# Patient Record
Sex: FEMALE | Race: WHITE | NOT HISPANIC OR LATINO | Employment: FULL TIME | URBAN - METROPOLITAN AREA
[De-identification: names, ages, dates, MRNs, and addresses within clinical notes are randomized per-mention and may not be internally consistent; named-entity substitution may affect disease eponyms.]

---

## 2024-02-05 ENCOUNTER — OFFICE VISIT (OUTPATIENT)
Dept: FAMILY MEDICINE | Facility: CLINIC | Age: 39
End: 2024-02-05
Payer: COMMERCIAL

## 2024-02-05 VITALS
HEART RATE: 53 BPM | TEMPERATURE: 98.6 F | DIASTOLIC BLOOD PRESSURE: 89 MMHG | RESPIRATION RATE: 14 BRPM | SYSTOLIC BLOOD PRESSURE: 146 MMHG | OXYGEN SATURATION: 98 %

## 2024-02-05 DIAGNOSIS — B37.31 YEAST INFECTION OF THE VAGINA: ICD-10-CM

## 2024-02-05 DIAGNOSIS — R20.2 LEG PARESTHESIA: ICD-10-CM

## 2024-02-05 DIAGNOSIS — R35.0 URINARY FREQUENCY: Primary | ICD-10-CM

## 2024-02-05 DIAGNOSIS — B96.89 BV (BACTERIAL VAGINOSIS): ICD-10-CM

## 2024-02-05 DIAGNOSIS — N76.0 BV (BACTERIAL VAGINOSIS): ICD-10-CM

## 2024-02-05 PROBLEM — I10 ESSENTIAL HYPERTENSION: Status: ACTIVE | Noted: 2021-10-01

## 2024-02-05 LAB
ALBUMIN UR-MCNC: NEGATIVE MG/DL
APPEARANCE UR: CLEAR
BACTERIA #/AREA URNS HPF: ABNORMAL /HPF
BILIRUB UR QL STRIP: NEGATIVE
CLUE CELLS: PRESENT
COLOR UR AUTO: YELLOW
GLUCOSE UR STRIP-MCNC: NEGATIVE MG/DL
HGB UR QL STRIP: NEGATIVE
KETONES UR STRIP-MCNC: NEGATIVE MG/DL
LEUKOCYTE ESTERASE UR QL STRIP: ABNORMAL
NITRATE UR QL: NEGATIVE
PH UR STRIP: 6.5 [PH] (ref 5–8)
RBC #/AREA URNS AUTO: ABNORMAL /HPF
SP GR UR STRIP: 1.01 (ref 1–1.03)
SQUAMOUS #/AREA URNS AUTO: ABNORMAL /LPF
TRICHOMONAS, WET PREP: ABNORMAL
UROBILINOGEN UR STRIP-ACNC: 0.2 E.U./DL
WBC #/AREA URNS AUTO: ABNORMAL /HPF
WBC'S/HIGH POWER FIELD, WET PREP: ABNORMAL
YEAST, WET PREP: PRESENT

## 2024-02-05 PROCEDURE — 87210 SMEAR WET MOUNT SALINE/INK: CPT | Performed by: PHYSICIAN ASSISTANT

## 2024-02-05 PROCEDURE — 87086 URINE CULTURE/COLONY COUNT: CPT | Performed by: PHYSICIAN ASSISTANT

## 2024-02-05 PROCEDURE — 81001 URINALYSIS AUTO W/SCOPE: CPT | Performed by: PHYSICIAN ASSISTANT

## 2024-02-05 RX ORDER — METRONIDAZOLE 500 MG/1
500 TABLET ORAL 2 TIMES DAILY
Qty: 14 TABLET | Refills: 0 | Status: SHIPPED | OUTPATIENT
Start: 2024-02-05 | End: 2024-02-12

## 2024-02-05 RX ORDER — FLUCONAZOLE 150 MG/1
TABLET ORAL
Qty: 3 TABLET | Refills: 0 | Status: SHIPPED | OUTPATIENT
Start: 2024-02-05 | End: 2024-08-09

## 2024-02-05 RX ORDER — HYDROCHLOROTHIAZIDE 12.5 MG/1
1 CAPSULE ORAL DAILY
COMMUNITY

## 2024-02-05 NOTE — PROGRESS NOTES
Chief Complaint   Patient presents with    UTI     Has urine frequency states was on walk today had tingling on whole left side  of body down leg into knee and states was leaking urine       ASSESSMENT/PLAN:  Soumya was seen today for uti.    Diagnoses and all orders for this visit:    Urinary frequency  -     UA Macroscopic with reflex to Microscopic and Culture - Clinic Collect  -     UA Microscopic with Reflex to Culture  -     Wet prep - Clinic Collect  -     Urine Culture Aerobic Bacterial - lab collect; Future  -     Urine Culture Aerobic Bacterial - lab collect    Leg paresthesia  -     Spine  Referral; Future    Yeast infection of the vagina  -     fluconazole (DIFLUCAN) 150 MG tablet; Take 1 tablet (150 mg) by mouth every 3 days for 2 to 3 doses    BV (bacterial vaginosis)  -     metroNIDAZOLE (FLAGYL) 500 MG tablet; Take 1 tablet (500 mg) by mouth 2 times daily for 7 days    Frequency without dysuria.  Still able to get the urine out and no saddle paresthesias.  Reassuring exam today with no neurologic abnormalities noted  UA negative.  Will send urine for culture.  Wet prep showed both clue cells and yeast.  Will do a longer course of Diflucan and also oral Flagyl    Considered cauda equina syndrome but this seems less likely without any saddle paresthesias and abnormal neurologic finding.  Recommend follow-up with spine specialist, referral placed.  Also consider reaching out to hip specialist that she has worked with in the past.  Return precautions discussed    Ankit Duke PA-C      SUBJECTIVE:  Soumya is a 38 year old female who presents to urgent care with about 2 weeks of a left leg heaviness type sensation.  It can be in the back, buttocks, thigh but never passes the knee.  It mostly is in the medial lower thigh.  Sometimes has some numbness and tingling but is usually more of a heaviness type sensation.  She has history of straight neck syndrome and some numbness in her left upper  extremity that comes and goes.  She has a history of right hip labral tear.  She goes to a chiropractor that does adjustments for her.  She also worked with the PT during this timeframe was done some exercises and dry needling and does not seem to be improving her symptoms.  Sometimes it is worse with walking sometimes not.  Over the last 3 to 4 days she has noticed urinary frequency and had some discharge.  She did think the discharge has a bad odor to it.  She did a televisit and was placed on Diflucan.  She is sexually active in a closed marriage.    ROS: Pertinent ROS neg other than the symptoms noted above in the HPI.     OBJECTIVE:  BP (!) 146/89   Pulse 53   Temp 98.6  F (37  C) (Oral)   Resp 14   SpO2 98%    GENERAL: alert and no distress  MS: no gross musculoskeletal defects noted, no edema  SKIN: no suspicious lesions or rashes  NEURO: Normal strength and tone, mentation intact and speech normal, no foot drop, normal gait, normal sensation of the left leg, deep tendon patellar reflexes 2+ bilaterally and symmetric    DIAGNOSTICS    Results for orders placed or performed in visit on 02/05/24   UA Macroscopic with reflex to Microscopic and Culture - Clinic Collect     Status: Abnormal    Specimen: Urine, Clean Catch   Result Value Ref Range    Color Urine Yellow Colorless, Straw, Light Yellow, Yellow    Appearance Urine Clear Clear    Glucose Urine Negative Negative mg/dL    Bilirubin Urine Negative Negative    Ketones Urine Negative Negative mg/dL    Specific Gravity Urine 1.010 1.005 - 1.030    Blood Urine Negative Negative    pH Urine 6.5 5.0 - 8.0    Protein Albumin Urine Negative Negative mg/dL    Urobilinogen Urine 0.2 0.2, 1.0 E.U./dL    Nitrite Urine Negative Negative    Leukocyte Esterase Urine Trace (A) Negative   UA Microscopic with Reflex to Culture     Status: Abnormal   Result Value Ref Range    Bacteria Urine Few (A) None Seen /HPF    RBC Urine 0-2 0-2 /HPF /HPF    WBC Urine 0-5 0-5 /HPF  /HPF    Squamous Epithelials Urine Few (A) None Seen /LPF    Narrative    Urine Culture not indicated   Wet prep - Clinic Collect     Status: Abnormal    Specimen: Vagina; Swab   Result Value Ref Range    Trichomonas Absent Absent    Yeast Present (A) Absent    Clue Cells Present (A) Absent    WBCs/high power field 1+ (A) None        Current Outpatient Medications   Medication    hydrochlorothiazide (MICROZIDE) 12.5 MG capsule     No current facility-administered medications for this visit.      There is no problem list on file for this patient.     No past medical history on file.  No past surgical history on file.  No family history on file.  Social History     Tobacco Use    Smoking status: Never    Smokeless tobacco: Never   Substance Use Topics    Alcohol use: Not on file              The plan of care was discussed with the patient. They understand and agree with the course of treatment prescribed. A printed summary was given including instructions and medications.  The use of Dragon/[employer] dictation services may have been used to construct the content in this note; any grammatical or spelling errors are non-intentional. Please contact the author of this note directly if you are in need of any clarification.

## 2024-02-06 NOTE — PROGRESS NOTES
ASSESSMENT: Soumya Olivier is a 38 year old female with past medical history significant for hypertension who presents today for new patient evaluation of a 2-year history of intermittent episodes of paresthesias in both arms and both legs.  Episodes typically resolve spontaneously within a couple of weeks.  Most recent episode began 2 to 3 weeks ago.  In current episode she has intermittent numbness and tingling and hot/cold feeling in both arms and in the left anterior thigh to the knee.  Unclear etiology of symptoms.  On exam patient reported a sensory deficit in the left anterior distal thigh.  She was hyperreflexic bilateral triceps and patellar reflexes.  Strength is intact.  She has not had any imaging of the spine.  Within the differential would be multiple sclerosis.    PLAN:  A shared decision making model was used.  The patient's values and choices were respected.  The following represents what was discussed and decided upon by the physician assistant and the patient.      1.  DIAGNOSTIC TESTS: I ordered MRI scans with and without contrast of brain, cervical, thoracic, and lumbar spine.    2.  PHYSICAL THERAPY: No physical therapy was ordered.    3.  MEDICATIONS: No changes are made to the patient's medications.  She takes ibuprofen as needed which helps.    4.  INTERVENTIONS:  No interventions were ordered today.    5.  PATIENT EDUCATION: Patient is in agreement the above plan.  All questions were answered.  -I told the patient that I may recommend referral to neurology, depending on her test results.    6.  FOLLOW-UP:   I will send the patient a [employer] message with her MRI results.  If she has questions or concerns in the meantime, she should not hesitate to call.      SUBJECTIVE:  Soumya Olivier  Is a 38 year old female who presents today in consultation at the request of Ankit Duke PA-C for new patient evaluation of paresthesias.  Patient reports that for the past 2 years she has had  intermittent episodes of paresthesias in her limbs.  She states that these episodes occur approximately every 6 months.  They typically last about 2 weeks.  Most recent episode began over 2 weeks ago.  This episode is lasting longer than any of her previous episodes.  She is unable to identify any triggers for the episodes.  They typically resolve spontaneously.  She has had chiropractic treatment which provide some relief as well.  Patient is very concerned about her symptoms because her mother  from ALS in her 40s.    Patient complains of  tingling in both arms.  Patient reports sometimes the biceps side of the arm is more affected and sometimes the tricep side is more affected.  Tends to be in the upper arms.  She has alternating hot and cold feeling in her arms.  With her current episode she also feels tingling involving the left anterior thigh to the left knee.  She states there is an area just proximal to the left anterior knee which is constantly numb and feels like scar tissue.  Occasionally the tingling has extended into the left calf but typically it stays in the thigh.  With previous episodes she had similar symptoms in the right leg but she denies any involvement of the right leg with her current episode.  She does have a history of right hip labral surgery.  Patient also has a history of chronic neck pain.  She was told that she had a straight neck.  She does feel like her neck pain is worse during her episodes of paresthesias.  She states the muscles in her neck feel tight.  She has to be careful with her activities to minimize neck pain.  She has also had occasional low back/sacral pain.  Her chiropractor often adjust her hips.  She feels a tender spot in the right SI joint region.  Recently she twisted and felt a adjustment type feeling in her lower back which was not painful.  Patient denies any weakness in her extremities.  She states that she is still able to function through these episodes.   In fact she was able to run 2 miles the other day.  She denies any muscle fasciculations.  She denies any double or blurry vision.  She denies history of optic neuritis.  3 days ago she did have leakage of urine while she was out for a walk but otherwise denies loss of bowel or bladder control.  Denies current pregnancy or plans to become pregnant soon.  Denies recent fevers.  She does endorse increased redness in the palms of her hands recently.  She also has intermittent swelling in her ankles.    Treatment to date:  - Chiropractic treatment 1-2 times per 2 months which is helpful.  - Dry needling through physical therapy which is helpful  - No spine surgeries  - No spine injections  - Ibuprofen as needed is helpful for both neck pain and for paresthesias.    Current Outpatient Medications   Medication    fluconazole (DIFLUCAN) 150 MG tablet    hydrochlorothiazide (MICROZIDE) 12.5 MG capsule    metroNIDAZOLE (FLAGYL) 500 MG tablet     No current facility-administered medications for this visit.       No Known Allergies        Patient Active Problem List   Diagnosis    Essential hypertension     Surgical history: Right hip labral surgery, varicose vein surgery 2019    Family history: Mother had ALS, father has degenerative disks and arthritis in his neck    Social history: Patient is .  She has a 4-year-old child.  She works in [employer].  She denies tobacco use.  Drinks wine on the weekends.  Denies recreational drug use.      ROS: Positive for weight gain, feet/leg swelling, color changes in hands/feet, sciatica.  Specifically negative for bowel/bladder dysfunction, fevers,chills, appetite changes, unexplained weight loss.   Otherwise 13 systems reviewed are negative.  Please see the patient's intake questionnaire from today for details.      OBJECTIVE:  PHYSICAL EXAMINATION:    CONSTITUTIONAL:  Vital signs as above.  No acute distress.  The patient is well nourished and well groomed.  PSYCHIATRIC:  The  patient is awake, alert, oriented to person, place, time and answering questions appropriately with clear speech.    HEENT: Normocephalic, atraumatic.  Sclera clear.  Neck is supple.  SKIN:  Skin over the face, bilateral lower extremities, and posterior torso is clean, dry, intact without rashes.    GAIT:  Gait is non-antalgic.  The patient is able to heel and toe walk without significant difficulty.  Tandem gait intact.  STANDING EXAMINATION: No significant tenderness palpation lumbar paraspinous muscles or sacroiliac joints.  Thoracolumbar range of motion is full.  MUSCLE STRENGTH:  The patient has 5/5 strength for the bilateral shoulder abductors, elbow flexors/extensors, wrist extensors, finger flexors/abductors, hip flexors, knee flexors/extensors, ankle dorsiflexors/plantar flexors, great toe extensors, ankle evertors/invertors.  NEUROLOGICAL: Reflexes are 2+ bilateral biceps, 3+ bilateral triceps, 2+ bilateral brachioradialis, 3+ bilateral patellar, 2+ bilateral Achilles.  Negative Shalini sign bilaterally.  Negative Babinski's bilaterally.  No ankle clonus bilaterally.  Diminished sensation left anterior distal thigh.  VASCULAR:  2/4 radial pulses bilaterally.  Bilateral lower extremities are warm.  There is no pitting edema of the bilateral lower extremities.  MUSCULOSKELETAL: Hypertonicity bilateral upper trapezius muscles.  Cervical range of motion is full.

## 2024-02-07 LAB — BACTERIA UR CULT: NORMAL

## 2024-02-08 ENCOUNTER — OFFICE VISIT (OUTPATIENT)
Dept: PHYSICAL MEDICINE AND REHAB | Facility: CLINIC | Age: 39
End: 2024-02-08
Payer: COMMERCIAL

## 2024-02-08 VITALS
BODY MASS INDEX: 23.2 KG/M2 | DIASTOLIC BLOOD PRESSURE: 80 MMHG | WEIGHT: 147.8 LBS | HEART RATE: 61 BPM | HEIGHT: 67 IN | SYSTOLIC BLOOD PRESSURE: 135 MMHG

## 2024-02-08 DIAGNOSIS — R20.2 PARESTHESIA: Primary | ICD-10-CM

## 2024-02-08 PROCEDURE — 99203 OFFICE O/P NEW LOW 30 MIN: CPT | Performed by: PHYSICIAN ASSISTANT

## 2024-02-08 RX ORDER — IBUPROFEN 200 MG
200 TABLET ORAL PRN
COMMUNITY

## 2024-02-08 ASSESSMENT — PAIN SCALES - GENERAL: PAINLEVEL: MILD PAIN (3)

## 2024-02-08 NOTE — PATIENT INSTRUCTIONS
[facility] Scheduling    Please call [phone] to schedule your image(s) (select option#1).     You can call cost of care to get an estimate for the costs of your tests: [phone]

## 2024-02-08 NOTE — LETTER
2/8/2024         RE: Soumya Olivier  [address] [address]        Dear Colleague,    Thank you for referring your patient, Soumya Olivier, to the [facility]. Please see a copy of my visit note below.    ASSESSMENT: Soumya Olivier is a 38 year old female with past medical history significant for hypertension who presents today for new patient evaluation of a 2-year history of intermittent episodes of paresthesias in both arms and both legs.  Episodes typically resolve spontaneously within a couple of weeks.  Most recent episode began 2 to 3 weeks ago.  In current episode she has intermittent numbness and tingling and hot/cold feeling in both arms and in the left anterior thigh to the knee.  Unclear etiology of symptoms.  On exam patient reported a sensory deficit in the left anterior distal thigh.  She was hyperreflexic bilateral triceps and patellar reflexes.  Strength is intact.  She has not had any imaging of the spine.  Within the differential would be multiple sclerosis.    PLAN:  A shared decision making model was used.  The patient's values and choices were respected.  The following represents what was discussed and decided upon by the physician assistant and the patient.      1.  DIAGNOSTIC TESTS: I ordered MRI scans with and without contrast of brain, cervical, thoracic, and lumbar spine.    2.  PHYSICAL THERAPY: No physical therapy was ordered.    3.  MEDICATIONS: No changes are made to the patient's medications.  She takes ibuprofen as needed which helps.    4.  INTERVENTIONS:  No interventions were ordered today.    5.  PATIENT EDUCATION: Patient is in agreement the above plan.  All questions were answered.  -I told the patient that I may recommend referral to neurology, depending on her test results.    6.  FOLLOW-UP:   I will send the patient a [employer] message with her MRI results.  If she has questions or concerns in the meantime, she should not  hesitate to call.      SUBJECTIVE:  Soumya Olivier  Is a 38 year old female who presents today in consultation at the request of Ankit Duke PA-C for new patient evaluation of paresthesias.  Patient reports that for the past 2 years she has had intermittent episodes of paresthesias in her limbs.  She states that these episodes occur approximately every 6 months.  They typically last about 2 weeks.  Most recent episode began over 2 weeks ago.  This episode is lasting longer than any of her previous episodes.  She is unable to identify any triggers for the episodes.  They typically resolve spontaneously.  She has had chiropractic treatment which provide some relief as well.  Patient is very concerned about her symptoms because her mother  from ALS in her 40s.    Patient complains of  tingling in both arms.  Patient reports sometimes the biceps side of the arm is more affected and sometimes the tricep side is more affected.  Tends to be in the upper arms.  She has alternating hot and cold feeling in her arms.  With her current episode she also feels tingling involving the left anterior thigh to the left knee.  She states there is an area just proximal to the left anterior knee which is constantly numb and feels like scar tissue.  Occasionally the tingling has extended into the left calf but typically it stays in the thigh.  With previous episodes she had similar symptoms in the right leg but she denies any involvement of the right leg with her current episode.  She does have a history of right hip labral surgery.  Patient also has a history of chronic neck pain.  She was told that she had a straight neck.  She does feel like her neck pain is worse during her episodes of paresthesias.  She states the muscles in her neck feel tight.  She has to be careful with her activities to minimize neck pain.  She has also had occasional low back/sacral pain.  Her chiropractor often adjust her hips.  She feels a tender  spot in the right SI joint region.  Recently she twisted and felt a adjustment type feeling in her lower back which was not painful.  Patient denies any weakness in her extremities.  She states that she is still able to function through these episodes.  In fact she was able to run 2 miles the other day.  She denies any muscle fasciculations.  She denies any double or blurry vision.  She denies history of optic neuritis.  3 days ago she did have leakage of urine while she was out for a walk but otherwise denies loss of bowel or bladder control.  Denies current pregnancy or plans to become pregnant soon.  Denies recent fevers.  She does endorse increased redness in the palms of her hands recently.  She also has intermittent swelling in her ankles.    Treatment to date:  - Chiropractic treatment 1-2 times per 2 months which is helpful.  - Dry needling through physical therapy which is helpful  - No spine surgeries  - No spine injections  - Ibuprofen as needed is helpful for both neck pain and for paresthesias.    Current Outpatient Medications   Medication     fluconazole (DIFLUCAN) 150 MG tablet     hydrochlorothiazide (MICROZIDE) 12.5 MG capsule     metroNIDAZOLE (FLAGYL) 500 MG tablet     No current facility-administered medications for this visit.       No Known Allergies        Patient Active Problem List   Diagnosis     Essential hypertension     Surgical history: Right hip labral surgery, varicose vein surgery 2019    Family history: Mother had ALS, father has degenerative disks and arthritis in his neck    Social history: Patient is .  She has a 4-year-old child.  She works in [employer].  She denies tobacco use.  Drinks wine on the weekends.  Denies recreational drug use.      ROS: Positive for weight gain, feet/leg swelling, color changes in hands/feet, sciatica.  Specifically negative for bowel/bladder dysfunction, fevers,chills, appetite changes, unexplained weight loss.   Otherwise 13 systems  reviewed are negative.  Please see the patient's intake questionnaire from today for details.      OBJECTIVE:  PHYSICAL EXAMINATION:    CONSTITUTIONAL:  Vital signs as above.  No acute distress.  The patient is well nourished and well groomed.  PSYCHIATRIC:  The patient is awake, alert, oriented to person, place, time and answering questions appropriately with clear speech.    HEENT: Normocephalic, atraumatic.  Sclera clear.  Neck is supple.  SKIN:  Skin over the face, bilateral lower extremities, and posterior torso is clean, dry, intact without rashes.    GAIT:  Gait is non-antalgic.  The patient is able to heel and toe walk without significant difficulty.  Tandem gait intact.  STANDING EXAMINATION: No significant tenderness palpation lumbar paraspinous muscles or sacroiliac joints.  Thoracolumbar range of motion is full.  MUSCLE STRENGTH:  The patient has 5/5 strength for the bilateral shoulder abductors, elbow flexors/extensors, wrist extensors, finger flexors/abductors, hip flexors, knee flexors/extensors, ankle dorsiflexors/plantar flexors, great toe extensors, ankle evertors/invertors.  NEUROLOGICAL: Reflexes are 2+ bilateral biceps, 3+ bilateral triceps, 2+ bilateral brachioradialis, 3+ bilateral patellar, 2+ bilateral Achilles.  Negative Shalini sign bilaterally.  Negative Babinski's bilaterally.  No ankle clonus bilaterally.  Diminished sensation left anterior distal thigh.  VASCULAR:  2/4 radial pulses bilaterally.  Bilateral lower extremities are warm.  There is no pitting edema of the bilateral lower extremities.  MUSCULOSKELETAL: Hypertonicity bilateral upper trapezius muscles.  Cervical range of motion is full.          Again, thank you for allowing me to participate in the care of your patient.        Sincerely,        Aury Childs PA-C

## 2024-02-23 ENCOUNTER — HOSPITAL ENCOUNTER (OUTPATIENT)
Dept: MRI IMAGING | Facility: CLINIC | Age: 39
Discharge: HOME | End: 2024-02-23
Attending: PHYSICIAN ASSISTANT | Admitting: PHYSICIAN ASSISTANT
Payer: COMMERCIAL

## 2024-02-23 DIAGNOSIS — R20.2 PARESTHESIA: ICD-10-CM

## 2024-02-23 PROCEDURE — 72156 MRI NECK SPINE W/O & W/DYE: CPT

## 2024-02-23 PROCEDURE — 72158 MRI LUMBAR SPINE W/O & W/DYE: CPT

## 2024-02-23 PROCEDURE — A9585 GADOBUTROL INJECTION: HCPCS | Performed by: PHYSICIAN ASSISTANT

## 2024-02-23 PROCEDURE — 255N000002 HC RX 255 OP 636: Performed by: PHYSICIAN ASSISTANT

## 2024-02-23 PROCEDURE — 70553 MRI BRAIN STEM W/O & W/DYE: CPT

## 2024-02-23 PROCEDURE — 72157 MRI CHEST SPINE W/O & W/DYE: CPT

## 2024-02-23 RX ORDER — GADOBUTROL 604.72 MG/ML
6.5 INJECTION INTRAVENOUS ONCE
Status: COMPLETED | OUTPATIENT
Start: 2024-02-23 | End: 2024-02-23

## 2024-02-23 RX ADMIN — GADOBUTROL 6.5 ML: 604.72 INJECTION INTRAVENOUS at 07:57

## 2024-02-26 DIAGNOSIS — R20.2 PARESTHESIA: Primary | ICD-10-CM

## 2024-02-26 DIAGNOSIS — M54.2 CERVICALGIA: Primary | ICD-10-CM

## 2024-03-09 ENCOUNTER — HEALTH MAINTENANCE LETTER (OUTPATIENT)
Age: 39
End: 2024-03-09

## 2024-07-29 ENCOUNTER — OFFICE VISIT (OUTPATIENT)
Dept: NEUROLOGY | Facility: CLINIC | Age: 39
End: 2024-07-29
Attending: PHYSICIAN ASSISTANT
Payer: COMMERCIAL

## 2024-07-29 VITALS — HEART RATE: 60 BPM | DIASTOLIC BLOOD PRESSURE: 83 MMHG | SYSTOLIC BLOOD PRESSURE: 134 MMHG

## 2024-07-29 DIAGNOSIS — R20.2 PARESTHESIA: ICD-10-CM

## 2024-07-29 DIAGNOSIS — M79.10 TRIGGER POINT: Primary | ICD-10-CM

## 2024-07-29 PROCEDURE — 99205 OFFICE O/P NEW HI 60 MIN: CPT | Performed by: PSYCHIATRY & NEUROLOGY

## 2024-07-29 NOTE — NURSING NOTE
Chief Complaint   Patient presents with    Consult     Paresthesia,  Referred by Aury Childs PA-C Kena Gemeda, MA on 7/29/2024 at 8:07 AM

## 2024-07-29 NOTE — LETTER
7/29/2024      Soumya Olivier  [address] [facility] [address]      Dear Colleague,    Thank you for referring your patient, Soumya Olivier, to the [facility] NEUROLOGY CLINIC [facility]. Please see a copy of my visit note below.    [facility] Neurology Consultation    Soumya Olivier MRN# [id number]   Age: 38 year old YOB: 1985     Requesting physician: Tayla Carter     Reason for Consultation: paresthesias      History of Presenting Symptoms:   Soumya Olivier is a 38 year old female who presents today for evaluation of paresthesias.      The patient was seen in a clinic 2/8/2024 with her PA-C for issues of leg and arm pain of a 2 year duration.  She described episodes of arm and leg pain occurring for weeks in bursts through the year.  Her exam was notable for sensory deficit in left anterior distal thigh and hyperreflexia in b/l triceps and patellar.  She reported her mother passed from ALS.  MRI of the entire brain and spinal axis was done 2/23/2024 with normal brain, cervical, thoracic, and lumbar cords being noted.  There was some mild degenerative disc issues at L4-5, as well as C5-7.    Today, the patient felt like she had her lower back have a locked up feeling for months or a long time.  Around 2/2024, she felt her gait changed and also got some sharp pains going down her arms (b/l) and down in to her knee (left).  By gait change, she felt like she was walking crooked and felt her left leg was flat footed.  She did have a tear in her left labrum, as evaluated recently with [facility].  She had it repaired since that time.  Also, since that time she has had upper back tightness (noticed at night) and has awoken at night with her arms falling asleep.  Shooting pain the arm is mostly gone. Her crooked walk is improved since her surgery.      She may have had several years of upper neck tightness, usually around the mid-section.  There are no issues of  diplopia, dysphagia, or muscle fasciculations.       Social History:   Works full time. Has one child. No alcohol abuse history reported.     Medications:   Hydrochlorothiazide for HTN     Physical Exam:   Vitals: /83 (BP Location: Right arm, Patient Position: Sitting)   Pulse 60    General: Seated comfortably in no acute distress.  Neurologic:     Mental Status: Fully alert, attentive and oriented. Speech clear and fluent, no paraphasic errors.      Cranial Nerves: Visual fields intact. PERRL. EOMI with normal smooth pursuit. Facial sensation intact/symmetric. Facial movements symmetric. Hearing not formally tested but intact to conversation. Palate elevation symmetric, uvula midline. No dysarthria. Shoulder shrug strong bilaterally. Tongue protrusion midline.     Motor: No tremors or other abnormal movements observed. Muscle tone normal throughout. No pronator drift. Normal/symmetric rapid finger tapping. Strength 5/5 throughout upper and lower extremities.     Deep Tendon Reflexes: 2+/symmetric throughout upper and lower extremities. No clonus. Toes downgoing bilaterally.     Sensory: Intact/symmetric to light touch, pinprick, temperature, vibration and proprioception throughout upper and lower extremities. Negative Romberg.      Coordination: Finger-nose-finger and heel-shin intact without dysmetria. Rapid alternating movements intact/symmetric with normal speed and rhythm.     Gait: Normal, steady casual gait. Able to walk on toes, heels and tandem without difficulty.         Data: Pertinent prior to visit   Imaging:  Reviewed MRI brain, cervical spine, thoracic spine, and lumbar spine as above.          Assessment and Plan:   Assessment:  Hx of left torn labrum  Trigger points of upper neck and trapezius b/l    The patient's exam is reassuringly normal, and her imaging is not suggestive for myelopathy, or central demyelinating disorder.  We spent some time discussing findings expected for conditions  like ALS, the genetics of the disease, and how her exam isn't at this time supportive for the diagnosis.  She was reassured at her normal exam, and was okay with focusing on issues like paraspinal and neck tension that was present for years.  At this time, her pattern of tightness seems to follow trigger points in the trapezius and levator scapulae. She may benefit from trigger point injections and stretching.  She is interested in continuing with her PT, but was okay with referral with physiatry.    Plan:  - PM&R referral for trigger points    Follow up in Neurology clinic as needed, or should new concerns arise.    SINAN Maciel D.O.   of Neurology    Total time today (60 min) in this patient encounter was spent on pre-charting, counseling and/or coordination of care.  The patient is in agreement with this plan and has no further questions.      Again, thank you for allowing me to participate in the care of your patient.        Sincerely,        Les Maciel, DO

## 2024-07-29 NOTE — PATIENT INSTRUCTIONS
I think your exam is normal today.  There are no signs of weakness, increased reflexes, or fasciculations concerning for ALS.  Your imaging looked normal upon review which is also reassuring for lack of evidence of demyelinating condition like MS.    At this point, I think trigger point injections and stretching will help you with your neck tension.  Overall, if new issues arise, please let me know through MyChart and we can always expand the investigation based on symptoms.

## 2024-07-29 NOTE — PROGRESS NOTES
[facility] Neurology Consultation    Soumya Olivier MRN# [id number]   Age: 38 year old YOB: 1985     Requesting physician: Tayla Carter     Reason for Consultation: paresthesias      History of Presenting Symptoms:   Soumya Olivier is a 38 year old female who presents today for evaluation of paresthesias.      The patient was seen in a clinic 2/8/2024 with her PA-C for issues of leg and arm pain of a 2 year duration.  She described episodes of arm and leg pain occurring for weeks in bursts through the year.  Her exam was notable for sensory deficit in left anterior distal thigh and hyperreflexia in b/l triceps and patellar.  She reported her mother passed from ALS.  MRI of the entire brain and spinal axis was done 2/23/2024 with normal brain, cervical, thoracic, and lumbar cords being noted.  There was some mild degenerative disc issues at L4-5, as well as C5-7.    Today, the patient felt like she had her lower back have a locked up feeling for months or a long time.  Around 2/2024, she felt her gait changed and also got some sharp pains going down her arms (b/l) and down in to her knee (left).  By gait change, she felt like she was walking crooked and felt her left leg was flat footed.  She did have a tear in her left labrum, as evaluated recently with [facility].  She had it repaired since that time.  Also, since that time she has had upper back tightness (noticed at night) and has awoken at night with her arms falling asleep.  Shooting pain the arm is mostly gone. Her crooked walk is improved since her surgery.      She may have had several years of upper neck tightness, usually around the mid-section.  There are no issues of diplopia, dysphagia, or muscle fasciculations.       Social History:   Works full time. Has one child. No alcohol abuse history reported.     Medications:   Hydrochlorothiazide for HTN     Physical Exam:   Vitals: /83 (BP Location: Right arm,  Patient Position: Sitting)   Pulse 60    General: Seated comfortably in no acute distress.  Neurologic:     Mental Status: Fully alert, attentive and oriented. Speech clear and fluent, no paraphasic errors.      Cranial Nerves: Visual fields intact. PERRL. EOMI with normal smooth pursuit. Facial sensation intact/symmetric. Facial movements symmetric. Hearing not formally tested but intact to conversation. Palate elevation symmetric, uvula midline. No dysarthria. Shoulder shrug strong bilaterally. Tongue protrusion midline.     Motor: No tremors or other abnormal movements observed. Muscle tone normal throughout. No pronator drift. Normal/symmetric rapid finger tapping. Strength 5/5 throughout upper and lower extremities.     Deep Tendon Reflexes: 2+/symmetric throughout upper and lower extremities. No clonus. Toes downgoing bilaterally.     Sensory: Intact/symmetric to light touch, pinprick, temperature, vibration and proprioception throughout upper and lower extremities. Negative Romberg.      Coordination: Finger-nose-finger and heel-shin intact without dysmetria. Rapid alternating movements intact/symmetric with normal speed and rhythm.     Gait: Normal, steady casual gait. Able to walk on toes, heels and tandem without difficulty.         Data: Pertinent prior to visit   Imaging:  Reviewed MRI brain, cervical spine, thoracic spine, and lumbar spine as above.          Assessment and Plan:   Assessment:  Hx of left torn labrum  Trigger points of upper neck and trapezius b/l    The patient's exam is reassuringly normal, and her imaging is not suggestive for myelopathy, or central demyelinating disorder.  We spent some time discussing findings expected for conditions like ALS, the genetics of the disease, and how her exam isn't at this time supportive for the diagnosis.  She was reassured at her normal exam, and was okay with focusing on issues like paraspinal and neck tension that was present for years.  At this  time, her pattern of tightness seems to follow trigger points in the trapezius and levator scapulae. She may benefit from trigger point injections and stretching.  She is interested in continuing with her PT, but was okay with referral with physiatry.    Plan:  - PM&R referral for trigger points    Follow up in Neurology clinic as needed, or should new concerns arise.    SINAN Maciel D.O.   of Neurology    Total time today (60 min) in this patient encounter was spent on pre-charting, counseling and/or coordination of care.  The patient is in agreement with this plan and has no further questions.

## 2024-07-31 ENCOUNTER — TELEPHONE (OUTPATIENT)
Dept: PHYSICAL MEDICINE AND REHAB | Facility: CLINIC | Age: 39
End: 2024-07-31
Payer: COMMERCIAL

## 2024-07-31 NOTE — TELEPHONE ENCOUNTER
Health Call Center    Phone Message    May a detailed message be left on voicemail: yes     Reason for Call: Appointment Intake    Referring Provider Name: Les Maciel  Diagnosis and/or Symptoms: Trigger point injection    Pt called to schedule stating she's been transferred multiple times already. Procedure line needs to be the one to schedule as [facility] scheduled can't schedule injections. Please leave correct number for Pt to call back to schedule procedures as this keeps being an issue. Pt needs to schedule TPI.    Action Taken: Message routed to:  [facility] PMR    Travel Screening: Not Applicable     Date of Service:

## 2024-08-01 NOTE — TELEPHONE ENCOUNTER
Returned call and spoke to pt. Offered New pt eval appt for Friday 8/3 at [place] pt declined, and requested Next Friday, 8-9-24.  Appt scheduled.

## 2024-08-09 ENCOUNTER — OFFICE VISIT (OUTPATIENT)
Dept: PHYSICAL MEDICINE AND REHAB | Facility: CLINIC | Age: 39
End: 2024-08-09
Payer: COMMERCIAL

## 2024-08-09 VITALS — OXYGEN SATURATION: 99 % | HEART RATE: 46 BPM | DIASTOLIC BLOOD PRESSURE: 87 MMHG | SYSTOLIC BLOOD PRESSURE: 151 MMHG

## 2024-08-09 DIAGNOSIS — G89.29 OTHER CHRONIC PAIN: ICD-10-CM

## 2024-08-09 DIAGNOSIS — M79.18 MYALGIA, OTHER SITE: Primary | ICD-10-CM

## 2024-08-09 DIAGNOSIS — M79.10 TRIGGER POINT: ICD-10-CM

## 2024-08-09 PROCEDURE — 20553 NJX 1/MLT TRIGGER POINTS 3/>: CPT | Performed by: PHYSICAL MEDICINE & REHABILITATION

## 2024-08-09 PROCEDURE — 99204 OFFICE O/P NEW MOD 45 MIN: CPT | Performed by: PHYSICAL MEDICINE & REHABILITATION

## 2024-08-09 RX ORDER — BUPIVACAINE HYDROCHLORIDE 5 MG/ML
5 INJECTION, SOLUTION EPIDURAL; INTRACAUDAL ONCE
Status: COMPLETED | OUTPATIENT
Start: 2024-08-09 | End: 2024-08-09

## 2024-08-09 RX ORDER — BETAMETHASONE SODIUM PHOSPHATE AND BETAMETHASONE ACETATE 3; 3 MG/ML; MG/ML
6 INJECTION, SUSPENSION INTRA-ARTICULAR; INTRALESIONAL; INTRAMUSCULAR; SOFT TISSUE ONCE
Status: COMPLETED | OUTPATIENT
Start: 2024-08-09 | End: 2024-08-09

## 2024-08-09 RX ADMIN — BETAMETHASONE SODIUM PHOSPHATE AND BETAMETHASONE ACETATE 6 MG: 3; 3 INJECTION, SUSPENSION INTRA-ARTICULAR; INTRALESIONAL; INTRAMUSCULAR; SOFT TISSUE at 16:13

## 2024-08-09 RX ADMIN — BUPIVACAINE HYDROCHLORIDE 25 MG: 5 INJECTION, SOLUTION EPIDURAL; INTRACAUDAL at 16:12

## 2024-08-09 NOTE — NURSING NOTE
"Soumya Olivier is a 38 year old female who presents for:  Chief Complaint   Patient presents with    Consult     Consult for tight muscles in her upper back that feel clingy and aching        Initial Vitals:  BP (!) 151/87   Pulse (!) 46   SpO2 99%  Estimated body mass index is 23.15 kg/m  as calculated from the following:    Height as of 2/8/24: 1.702 m (5' 7\").    Weight as of 2/8/24: 67 kg (147 lb 12.8 oz).. There is no height or weight on file to calculate BSA. BP completed using cuff size: regular  Data Unavailable    Nursing Comments:     Lucien Rebollar    " 77 y/o M with pmhx of alzheimer's dementia, HTN, presented to the ED for new syncope episode. EP consult to place ILR and to follow up outpatient. Admit for ILR placement.

## 2024-08-09 NOTE — LETTER
8/9/2024       RE: Soumya Olivier  [address] [facility] [address]     Dear Colleague,    Thank you for referring your patient, Soumya Olivier, to the [facility]. Please see a copy of my visit note below.    CC: I am seeing this patient at the request of Dr. Les Maciel, for consideration of trigger point injections for muscle tightness and pain.    Patient is a very pleasant 38-year-old woman who reports having tightness with pain in the neck and shoulder region for the past 2 and half years.  She also has some back and gluteal pain.  She attributes this to lifting weights and has reduced that significantly.  She was evaluated by neurology because she was also describing some sensory deficit in the left thigh and brisk reflexes.  There is a family history of ALS in her mother.  She underwent imaging of the brain under spinal segments which were noted to be essentially unremarkable.  There is mild degenerative changes noted at L4-5 and C5 7.  She finds the pain is a 1 at best 5 currently and 9 at its worst.  It can be a 9 2-3 times a week.  She also has issues in her mid upper back region.  She denies any trauma and there is no history of motor vehicle accident.  She continues to remain active.  She also is a runner and ended up having a labral tear requiring hip surgery.  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  She is careful with any lifting at this point.  She also reports some flareup in her lower spine also resulting in some weird sensation.  She is hoping for some relief.    She takes hydrochlorothiazide for hypertension.  She has taken ibuprofen as needed    EXAM: MR CERVICAL SPINE W/O and W CONTRAST, MR THORACIC SPINE W/O and W CONTRAST, MR LUMBAR SPINE W/O and W CONTRAST, MR BRAIN W/O and W CONTRAST  LOCATION: [facility]  DATE: 2/23/2024     INDICATION: migrating  paresthesia  COMPARISON: None.  CONTRAST: 6.5 ml Gadavist given  TECHNIQUE:   1) Routine multiplanar multisequence head MRI without and with intravenous contrast.  2) Routine Cervical Spine MRI without and with IV contrast.  3) Routine Thoracic Spine MRI without and with IV contrast.  4) Routine Lumbar Spine MRI without and with IV contrast.     FINDINGS:  HEAD MRI:  INTRACRANIAL CONTENTS: No acute or subacute infarct. No mass, acute hemorrhage, or extra-axial fluid collections. Normal brain parenchymal signal. Normal ventricles and sulci. Normal position of the cerebellar tonsils. No pathologic enhancement.     SELLA: No abnormality accounting for technique.     OSSEOUS STRUCTURES/SOFT TISSUES: Normal marrow signal. The major intracranial vascular flow voids are maintained.      ORBITS: No abnormality accounting for technique.      SINUSES/MASTOIDS: No paranasal sinus mucosal disease. No middle ear or mastoid effusion.      CERVICAL SPINE:   Grade 1 retrolisthesis of C5 on C6. Vertebral body heights are maintained. Modic type II endplate change at C5-C6 and C6-C7. Vertebral body heights are maintained. No abnormal cord signal. No extraspinal abnormality. Negative for pathologic contrast   enhancement.     Craniovertebral junction and C1-C2: Normal.     C2-C3: Normal disc height. No herniation. Normal facets. No spinal canal or neural foraminal stenosis.      C3-C4: Normal disc height. No herniation. Normal facets. No spinal canal or neural foraminal stenosis.      C4-C5: Normal disc height. No herniation. Normal facets. No spinal canal or neural foraminal stenosis.      C5-C6: Mild disc bulge. Mild facet hypertrophy. Mild spinal canal stenosis. Mild right foraminal stenosis. No left foraminal stenosis.      C6-C7: Disc osteophyte complex. Normal facets. No spinal canal or foraminal stenosis.      C7-T1: Normal disc height. No herniation. Normal facets. No spinal canal or neural foraminal stenosis.     THORACIC  SPINE:  Normal vertebral body heights, alignment and marrow signal. Normal disc heights. No herniation. Normal facets. No spinal canal or neural foraminal stenosis. No abnormal cord signal. Negative for pathologic contrast enhancement.     No extraspinal abnormality.     LUMBAR SPINE:   Nomenclature is based on 5 lumbar type vertebral bodies. Normal vertebral body heights, alignment and marrow signal. Normal distal spinal cord and cauda equina with conus medullaris at T12-L1. No extraspinal abnormality. Partial kidney is incidentally   noted. Negative for pathologic contrast enhancement.     T12-L1: Normal disc height and signal. No herniation. Normal facets. No spinal canal or neural foraminal stenosis.      L1-L2: Normal disc height and signal. No herniation. Normal facets. No spinal canal or neural foraminal stenosis.     L2-L3: Normal disc height and signal. No herniation. Normal facets. No spinal canal or neural foraminal stenosis.      L3-L4: Normal disc height and signal. No herniation. Normal facets. No spinal canal or neural foraminal stenosis.     L4-L5: Normal disc height and signal. Mild disc bulge. Bilateral facet hypertrophy with small facet effusions. No spinal canal or neural foraminal stenosis.     L5-S1: Normal disc height and signal. No herniation. Normal facets. No spinal canal or neural foraminal stenosis.                                                                      IMPRESSION:  HEAD MRI:  Normal head MRI.     CERVICAL SPINE MRI:  1.  Degenerative disc disease at C5-C6 and C6-C7. No high-grade spinal canal or foraminal stenosis.  2.  No cord signal abnormality.  3.  Modic type II endplate change at C5-C6 and C6-C7     THORACIC SPINE MRI:  Normal MRI of the thoracic spine.     LUMBAR SPINE MRI:  1.  Mild degenerative disc disease at L4-L5.  2.  No spinal canal or foraminal stenosis.  3.  No pathologic contrast enhancement.       Current Outpatient Medications   Medication Sig Dispense  Refill     hydrochlorothiazide (MICROZIDE) 12.5 MG capsule Take 1 capsule by mouth daily       ibuprofen (ADVIL/MOTRIN) 200 MG tablet Take 200 mg by mouth as needed for pain       fluconazole (DIFLUCAN) 150 MG tablet Take 1 tablet (150 mg) by mouth every 3 days for 2 to 3 doses 3 tablet 0     MICROGESTIN FE 1/20, 28, 1 mg-20 mcg (21)/75 mg (7) per tablet [MICROGESTIN FE 1/20, 28, 1 MG-20 MCG (21)/75 MG (7) PER TABLET]   0      Social History     Socioeconomic History     Marital status:      Spouse name: Not on file     Number of children: Not on file     Years of education: Not on file     Highest education level: Not on file   Occupational History     Not on file   Tobacco Use     Smoking status: Never     Smokeless tobacco: Never   Substance and Sexual Activity     Alcohol use: Not on file     Drug use: Not on file     Sexual activity: Not on file   Other Topics Concern     Not on file   Social History Narrative    ** Merged History Encounter **          Social Determinants of Health     Financial Resource Strain: Not At Risk (6/5/2023)    [facility] [employer]    Financial Resource Strain      Is it hard for you to pay for the very basics like food, housing, medical care or heating?: No   Food Insecurity: Not At Risk (6/5/2023)    [facility] [employer]    Food Insecurity      Does your food run out before you have the money to buy more?: No   Transportation Needs: Not At Risk (6/5/2023)    Received from [employer], [facility]    Transportation Needs      Does a lack of transportation keep you from your medical appointments or from getting your medications?: No   Physical Activity: Not on file   Stress: Not on file   Social Connections: Not on file   Interpersonal Safety: Not on file   Housing Stability: Not on file        BP (!) 151/87   Pulse (!) 46   SpO2 99%      On examination, patient is alert and cooperative.  Vital signs are stable.  She is  afebrile.    HEENT is negative.  Extraocular movements are intact.  Face is symmetric.  Tongue is midline.  Neck is supple.    She is able to move her upper extremities functionally.  She is able to carry out straight leg raising test.  Jordon's negative.    She is able to stand.  Romberg is negative.  Gait is unremarkable.  She is able to walk on her heels and toes.  Coordination tests are intact.    Musculoskeletal examination reveals tenderness over the levator scapula and paraspinal region of the neck bilaterally.  Trapezius, rhomboids minor and infraspinatus on either side were tender.  She is also tender over the piriformis bilaterally.  There is no tenderness over the SI joints or the trochanters.  There is no tenderness over the greater or lesser occipital nerves or the cervical facets.    Impression: At this point this 38-year-old woman with chronic tightness and pain affecting head neck and shoulder region has features of myalgia with involvement of multiple muscles resulting in sustained tightness pain and some disturbed sensation.  Some of this could be coming from the involvement of the muscles noted including infraspinatus as well as the piriformis.  In terms of treatment, in addition to being careful with her activities such as weightlifting etc. we talked about therapies as well as medications.  She is currently doing therapies for her hip.  Alternative to medication such as tizanidine and Mobic would be trigger point injections.  She would like to get that done to see how she responds and we will see her back for a follow-up in 4 weeks time to make additional determination.    45 minutes for the evaluation and management portion of the visit, exclusive of the procedure time, greater than 50% was for counseling on above-mentioned issues.    Procedure note: With informed consent, after explaining the benefits and risks of the procedure, using ChloraPrep for skin prep, 1 cc of Celestone with 5 cc of  0.5% Marcaine, using 25-gauge needle, triggers in the cervical paraspinals, trapezius and levator scapula in the neck and shoulder region, infraspinatus and rhomboids minor on either side were injected with relief.  Postinjection, she found excellent relief in both the tightness and discomfort.  She can ice the region as needed, modified activities to avoid flareups and return as noted in 4 weeks time.    Thank you much for allowing me to assist in her care.    Mathieu Crowder MD       Again, thank you for allowing me to participate in the care of your patient.      Sincerely,    Mathieu Crowder MD

## 2024-08-09 NOTE — PROGRESS NOTES
CC: I am seeing this patient at the request of Dr. Les Maciel, for consideration of trigger point injections for muscle tightness and pain.    Patient is a very pleasant 38-year-old woman who reports having tightness with pain in the neck and shoulder region for the past 2 and half years.  She also has some back and gluteal pain.  She attributes this to lifting weights and has reduced that significantly.  She was evaluated by neurology because she was also describing some sensory deficit in the left thigh and brisk reflexes.  There is a family history of ALS in her mother.  She underwent imaging of the brain under spinal segments which were noted to be essentially unremarkable.  There is mild degenerative changes noted at L4-5 and C5 7.  She finds the pain is a 1 at best 5 currently and 9 at its worst.  It can be a 9 2-3 times a week.  She also has issues in her mid upper back region.  She denies any trauma and there is no history of motor vehicle accident.  She continues to remain active.  She also is a runner and ended up having a labral tear requiring hip surgery.  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  She is careful with any lifting at this point.  She also reports some flareup in her lower spine also resulting in some weird sensation.  She is hoping for some relief.    She takes hydrochlorothiazide for hypertension.  She has taken ibuprofen as needed    EXAM: MR CERVICAL SPINE W/O and W CONTRAST, MR THORACIC SPINE W/O and W CONTRAST, MR LUMBAR SPINE W/O and W CONTRAST, MR BRAIN W/O and W CONTRAST  LOCATION: [facility]  DATE: 2/23/2024     INDICATION: migrating paresthesia  COMPARISON: None.  CONTRAST: 6.5 ml Gadavist given  TECHNIQUE:   1) Routine multiplanar multisequence head MRI without and with intravenous contrast.  2) Routine Cervical Spine MRI without and with IV contrast.  3) Routine Thoracic Spine MRI without and with IV contrast.  4) Routine Lumbar Spine  MRI without and with IV contrast.     FINDINGS:  HEAD MRI:  INTRACRANIAL CONTENTS: No acute or subacute infarct. No mass, acute hemorrhage, or extra-axial fluid collections. Normal brain parenchymal signal. Normal ventricles and sulci. Normal position of the cerebellar tonsils. No pathologic enhancement.     SELLA: No abnormality accounting for technique.     OSSEOUS STRUCTURES/SOFT TISSUES: Normal marrow signal. The major intracranial vascular flow voids are maintained.      ORBITS: No abnormality accounting for technique.      SINUSES/MASTOIDS: No paranasal sinus mucosal disease. No middle ear or mastoid effusion.      CERVICAL SPINE:   Grade 1 retrolisthesis of C5 on C6. Vertebral body heights are maintained. Modic type II endplate change at C5-C6 and C6-C7. Vertebral body heights are maintained. No abnormal cord signal. No extraspinal abnormality. Negative for pathologic contrast   enhancement.     Craniovertebral junction and C1-C2: Normal.     C2-C3: Normal disc height. No herniation. Normal facets. No spinal canal or neural foraminal stenosis.      C3-C4: Normal disc height. No herniation. Normal facets. No spinal canal or neural foraminal stenosis.      C4-C5: Normal disc height. No herniation. Normal facets. No spinal canal or neural foraminal stenosis.      C5-C6: Mild disc bulge. Mild facet hypertrophy. Mild spinal canal stenosis. Mild right foraminal stenosis. No left foraminal stenosis.      C6-C7: Disc osteophyte complex. Normal facets. No spinal canal or foraminal stenosis.      C7-T1: Normal disc height. No herniation. Normal facets. No spinal canal or neural foraminal stenosis.     THORACIC SPINE:  Normal vertebral body heights, alignment and marrow signal. Normal disc heights. No herniation. Normal facets. No spinal canal or neural foraminal stenosis. No abnormal cord signal. Negative for pathologic contrast enhancement.     No extraspinal abnormality.     LUMBAR SPINE:   Nomenclature is based on 5  lumbar type vertebral bodies. Normal vertebral body heights, alignment and marrow signal. Normal distal spinal cord and cauda equina with conus medullaris at T12-L1. No extraspinal abnormality. Partial kidney is incidentally   noted. Negative for pathologic contrast enhancement.     T12-L1: Normal disc height and signal. No herniation. Normal facets. No spinal canal or neural foraminal stenosis.      L1-L2: Normal disc height and signal. No herniation. Normal facets. No spinal canal or neural foraminal stenosis.     L2-L3: Normal disc height and signal. No herniation. Normal facets. No spinal canal or neural foraminal stenosis.      L3-L4: Normal disc height and signal. No herniation. Normal facets. No spinal canal or neural foraminal stenosis.     L4-L5: Normal disc height and signal. Mild disc bulge. Bilateral facet hypertrophy with small facet effusions. No spinal canal or neural foraminal stenosis.     L5-S1: Normal disc height and signal. No herniation. Normal facets. No spinal canal or neural foraminal stenosis.                                                                      IMPRESSION:  HEAD MRI:  Normal head MRI.     CERVICAL SPINE MRI:  1.  Degenerative disc disease at C5-C6 and C6-C7. No high-grade spinal canal or foraminal stenosis.  2.  No cord signal abnormality.  3.  Modic type II endplate change at C5-C6 and C6-C7     THORACIC SPINE MRI:  Normal MRI of the thoracic spine.     LUMBAR SPINE MRI:  1.  Mild degenerative disc disease at L4-L5.  2.  No spinal canal or foraminal stenosis.  3.  No pathologic contrast enhancement.       Current Outpatient Medications   Medication Sig Dispense Refill    hydrochlorothiazide (MICROZIDE) 12.5 MG capsule Take 1 capsule by mouth daily      ibuprofen (ADVIL/MOTRIN) 200 MG tablet Take 200 mg by mouth as needed for pain      fluconazole (DIFLUCAN) 150 MG tablet Take 1 tablet (150 mg) by mouth every 3 days for 2 to 3 doses 3 tablet 0    MICROGESTIN FE 1/20, 28, 1  mg-20 mcg (21)/75 mg (7) per tablet [MICROGESTIN FE 1/20, 28, 1 MG-20 MCG (21)/75 MG (7) PER TABLET]   0      Social History     Socioeconomic History    Marital status:      Spouse name: Not on file    Number of children: Not on file    Years of education: Not on file    Highest education level: Not on file   Occupational History    Not on file   Tobacco Use    Smoking status: Never    Smokeless tobacco: Never   Substance and Sexual Activity    Alcohol use: Not on file    Drug use: Not on file    Sexual activity: Not on file   Other Topics Concern    Not on file   Social History Narrative    ** Merged History Encounter **          Social Determinants of Health     Financial Resource Strain: Not At Risk (6/5/2023)    Received from [employer]    Financial Resource Strain     Is it hard for you to pay for the very basics like food, housing, medical care or heating?: No   Food Insecurity: Not At Risk (6/5/2023)    Received from [facility]    Food Insecurity     Does your food run out before you have the money to buy more?: No   Transportation Needs: Not At Risk (6/5/2023)    Received from [facility]    Transportation Needs     Does a lack of transportation keep you from your medical appointments or from getting your medications?: No   Physical Activity: Not on file   Stress: Not on file   Social Connections: Not on file   Interpersonal Safety: Not on file   Housing Stability: Not on file        BP (!) 151/87   Pulse (!) 46   SpO2 99%      On examination, patient is alert and cooperative.  Vital signs are stable.  She is afebrile.    HEENT is negative.  Extraocular movements are intact.  Face is symmetric.  Tongue is midline.  Neck is supple.    She is able to move her upper extremities functionally.  She is able to carry out straight leg raising test.  Jordon's negative.    She is able to stand.  Romberg is negative.  Gait is unremarkable.  She is  able to walk on her heels and toes.  Coordination tests are intact.    Musculoskeletal examination reveals tenderness over the levator scapula and paraspinal region of the neck bilaterally.  Trapezius, rhomboids minor and infraspinatus on either side were tender.  She is also tender over the piriformis bilaterally.  There is no tenderness over the SI joints or the trochanters.  There is no tenderness over the greater or lesser occipital nerves or the cervical facets.    Impression: At this point this 38-year-old woman with chronic tightness and pain affecting head neck and shoulder region has features of myalgia with involvement of multiple muscles resulting in sustained tightness pain and some disturbed sensation.  Some of this could be coming from the involvement of the muscles noted including infraspinatus as well as the piriformis.  In terms of treatment, in addition to being careful with her activities such as weightlifting etc. we talked about therapies as well as medications.  She is currently doing therapies for her hip.  Alternative to medication such as tizanidine and Mobic would be trigger point injections.  She would like to get that done to see how she responds and we will see her back for a follow-up in 4 weeks time to make additional determination.    45 minutes for the evaluation and management portion of the visit, exclusive of the procedure time, greater than 50% was for counseling on above-mentioned issues.    Procedure note: With informed consent, after explaining the benefits and risks of the procedure, using ChloraPrep for skin prep, 1 cc of Celestone with 5 cc of 0.5% Marcaine, using 25-gauge needle, triggers in the cervical paraspinals, trapezius and levator scapula in the neck and shoulder region, infraspinatus and rhomboids minor on either side were injected with relief.  Postinjection, she found excellent relief in both the tightness and discomfort.  She can ice the region as needed,  modified activities to avoid flareups and return as noted in 4 weeks time.    Thank you much for allowing me to assist in her care.    Mathieu Crowder MD

## 2024-09-17 ENCOUNTER — OFFICE VISIT (OUTPATIENT)
Dept: PHYSICAL MEDICINE AND REHAB | Facility: CLINIC | Age: 39
End: 2024-09-17
Payer: COMMERCIAL

## 2024-09-17 DIAGNOSIS — M54.2 CERVICALGIA: ICD-10-CM

## 2024-09-17 DIAGNOSIS — G89.29 OTHER CHRONIC PAIN: ICD-10-CM

## 2024-09-17 DIAGNOSIS — M79.18 MYALGIA, OTHER SITE: Primary | ICD-10-CM

## 2024-09-17 PROCEDURE — 99213 OFFICE O/P EST LOW 20 MIN: CPT | Mod: 25 | Performed by: PHYSICAL MEDICINE & REHABILITATION

## 2024-09-17 PROCEDURE — 20553 NJX 1/MLT TRIGGER POINTS 3/>: CPT | Performed by: PHYSICAL MEDICINE & REHABILITATION

## 2024-09-17 RX ORDER — TIZANIDINE 2 MG/1
2 TABLET ORAL 3 TIMES DAILY
Qty: 90 TABLET | Refills: 1 | Status: SHIPPED | OUTPATIENT
Start: 2024-09-17

## 2024-09-17 RX ORDER — BETAMETHASONE SODIUM PHOSPHATE AND BETAMETHASONE ACETATE 3; 3 MG/ML; MG/ML
6 INJECTION, SUSPENSION INTRA-ARTICULAR; INTRALESIONAL; INTRAMUSCULAR; SOFT TISSUE ONCE
Status: COMPLETED | OUTPATIENT
Start: 2024-09-17 | End: 2024-09-17

## 2024-09-17 RX ORDER — BUPIVACAINE HYDROCHLORIDE 5 MG/ML
4 INJECTION, SOLUTION EPIDURAL; INTRACAUDAL ONCE
Status: COMPLETED | OUTPATIENT
Start: 2024-09-17 | End: 2024-09-17

## 2024-09-17 RX ADMIN — BUPIVACAINE HYDROCHLORIDE 20 MG: 5 INJECTION, SOLUTION EPIDURAL; INTRACAUDAL at 16:50

## 2024-09-17 RX ADMIN — BETAMETHASONE SODIUM PHOSPHATE AND BETAMETHASONE ACETATE 6 MG: 3; 3 INJECTION, SUSPENSION INTRA-ARTICULAR; INTRALESIONAL; INTRAMUSCULAR; SOFT TISSUE at 16:50

## 2024-09-17 NOTE — LETTER
9/17/2024       RE: Soumya Olivier  [address] [facility] [id number]     Dear Colleague,    Thank you for referring your patient, Soumya Olivier, to the [facility]. Please see a copy of my visit note below.    Patient is here for trigger point injections for muscle tightness and pain.    She was last seen by me on 8/9/2024.  She felt it really helped with her mid back pain but the pain in the neck and shoulder continues.  She is here for reevaluation.     Patient is a very pleasant 38-year-old woman who reports having tightness with pain in the neck and shoulder region for the past 2 and half years.  She also has some back and gluteal pain.  She attributes this to lifting weights and has reduced that significantly.  She was evaluated by neurology because she was also describing some sensory deficit in the left thigh and brisk reflexes.  There is a family history of ALS in her mother.  She underwent imaging of the brain under spinal segments which were noted to be essentially unremarkable.  There is mild degenerative changes noted at L4-5 and C5 7.  She finds the pain is a 1 at best 5 currently and 9 at its worst.  It can be a 9 2-3 times a week.  She also has issues in her mid upper back region.  She denies any trauma and there is no history of motor vehicle accident.  She continues to remain active.  She also is a runner and ended up having a labral tear requiring hip surgery.  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  She is careful with any lifting at this point.  She also reports some flareup in her lower spine also resulting in some weird sensation.  She is hoping for some relief.     She takes hydrochlorothiazide for hypertension.  She has taken ibuprofen as needed     EXAM: MR CERVICAL SPINE W/O and W CONTRAST, MR THORACIC SPINE W/O and W CONTRAST, MR LUMBAR SPINE W/O and W CONTRAST, MR BRAIN W/O and W  CONTRAST  LOCATION: [facility]  DATE: 2/23/2024     INDICATION: migrating paresthesia  COMPARISON: None.  CONTRAST: 6.5 ml Gadavist given  TECHNIQUE:   1) Routine multiplanar multisequence head MRI without and with intravenous contrast.  2) Routine Cervical Spine MRI without and with IV contrast.  3) Routine Thoracic Spine MRI without and with IV contrast.  4) Routine Lumbar Spine MRI without and with IV contrast.     FINDINGS:  HEAD MRI:  INTRACRANIAL CONTENTS: No acute or subacute infarct. No mass, acute hemorrhage, or extra-axial fluid collections. Normal brain parenchymal signal. Normal ventricles and sulci. Normal position of the cerebellar tonsils. No pathologic enhancement.     SELLA: No abnormality accounting for technique.     OSSEOUS STRUCTURES/SOFT TISSUES: Normal marrow signal. The major intracranial vascular flow voids are maintained.      ORBITS: No abnormality accounting for technique.      SINUSES/MASTOIDS: No paranasal sinus mucosal disease. No middle ear or mastoid effusion.      CERVICAL SPINE:   Grade 1 retrolisthesis of C5 on C6. Vertebral body heights are maintained. Modic type II endplate change at C5-C6 and C6-C7. Vertebral body heights are maintained. No abnormal cord signal. No extraspinal abnormality. Negative for pathologic contrast   enhancement.     Craniovertebral junction and C1-C2: Normal.     C2-C3: Normal disc height. No herniation. Normal facets. No spinal canal or neural foraminal stenosis.      C3-C4: Normal disc height. No herniation. Normal facets. No spinal canal or neural foraminal stenosis.      C4-C5: Normal disc height. No herniation. Normal facets. No spinal canal or neural foraminal stenosis.      C5-C6: Mild disc bulge. Mild facet hypertrophy. Mild spinal canal stenosis. Mild right foraminal stenosis. No left foraminal stenosis.      C6-C7: Disc osteophyte complex. Normal facets. No spinal canal or foraminal stenosis.      C7-T1: Normal disc  height. No herniation. Normal facets. No spinal canal or neural foraminal stenosis.     THORACIC SPINE:  Normal vertebral body heights, alignment and marrow signal. Normal disc heights. No herniation. Normal facets. No spinal canal or neural foraminal stenosis. No abnormal cord signal. Negative for pathologic contrast enhancement.     No extraspinal abnormality.     LUMBAR SPINE:   Nomenclature is based on 5 lumbar type vertebral bodies. Normal vertebral body heights, alignment and marrow signal. Normal distal spinal cord and cauda equina with conus medullaris at T12-L1. No extraspinal abnormality. Partial kidney is incidentally   noted. Negative for pathologic contrast enhancement.     T12-L1: Normal disc height and signal. No herniation. Normal facets. No spinal canal or neural foraminal stenosis.      L1-L2: Normal disc height and signal. No herniation. Normal facets. No spinal canal or neural foraminal stenosis.     L2-L3: Normal disc height and signal. No herniation. Normal facets. No spinal canal or neural foraminal stenosis.      L3-L4: Normal disc height and signal. No herniation. Normal facets. No spinal canal or neural foraminal stenosis.     L4-L5: Normal disc height and signal. Mild disc bulge. Bilateral facet hypertrophy with small facet effusions. No spinal canal or neural foraminal stenosis.     L5-S1: Normal disc height and signal. No herniation. Normal facets. No spinal canal or neural foraminal stenosis.                                                                      IMPRESSION:  HEAD MRI:  Normal head MRI.     CERVICAL SPINE MRI:  1.  Degenerative disc disease at C5-C6 and C6-C7. No high-grade spinal canal or foraminal stenosis.  2.  No cord signal abnormality.  3.  Modic type II endplate change at C5-C6 and C6-C7     THORACIC SPINE MRI:  Normal MRI of the thoracic spine.     LUMBAR SPINE MRI:  1.  Mild degenerative disc disease at L4-L5.  2.  No spinal canal or foraminal stenosis.  3.  No  pathologic contrast enhancement.        Current Outpatient Prescriptions          Current Outpatient Medications   Medication Sig Dispense Refill     hydrochlorothiazide (MICROZIDE) 12.5 MG capsule Take 1 capsule by mouth daily         ibuprofen (ADVIL/MOTRIN) 200 MG tablet Take 200 mg by mouth as needed for pain         fluconazole (DIFLUCAN) 150 MG tablet Take 1 tablet (150 mg) by mouth every 3 days for 2 to 3 doses 3 tablet 0     MICROGESTIN FE 1/20, 28, 1 mg-20 mcg (21)/75 mg (7) per tablet [MICROGESTIN FE 1/20, 28, 1 MG-20 MCG (21)/75 MG (7) PER TABLET]    0         Social History   Social History            Socioeconomic History     Marital status:        Spouse name: Not on file     Number of children: Not on file     Years of education: Not on file     Highest education level: Not on file   Occupational History     Not on file   Tobacco Use     Smoking status: Never     Smokeless tobacco: Never   Substance and Sexual Activity     Alcohol use: Not on file     Drug use: Not on file     Sexual activity: Not on file   Other Topics Concern     Not on file   Social History Narrative     ** Merged History Encounter **            Social Determinants of Health           Financial Resource Strain: Not At Risk (6/5/2023)     Received from [facility], [facility]     Financial Resource Strain       Is it hard for you to pay for the very basics like food, housing, medical care or heating?: No   Food Insecurity: Not At Risk (6/5/2023)     Received from [facility], [facility]     Food Insecurity       Does your food run out before you have the money to buy more?: No   Transportation Needs: Not At Risk (6/5/2023)     Received from [employer], [facility]     Transportation Needs       Does a lack of transportation keep you from your medical appointments or from getting your medications?: No   Physical Activity: Not on file   Stress: Not on file   Social Connections: Not on file   Interpersonal  Safety: Not on file   Housing Stability: Not on file            There were no vitals taken for this visit.      On examination, patient is alert and cooperative.  Vital signs are stable.  She is afebrile.     HEENT is negative.  Extraocular movements are intact.  Face is symmetric.  Tongue is midline.  Neck is supple.     She is able to move her upper extremities functionally.  She is able to carry out straight leg raising test.  Jordon's negative.     She is able to stand.  Romberg is negative.  Gait is unremarkable.  She is able to walk on her heels and toes.  Coordination tests are intact.     Musculoskeletal examination reveals tenderness over the levator scapula and paraspinal region of the neck bilaterally.  Trapezius, and infraspinatus on either side were tender. There is no tenderness over the greater or lesser occipital nerves or the cervical facets.     Impression: At this point this 38-year-old woman with chronic tightness and pain affecting head neck and shoulder region has features of myalgia with involvement of multiple muscles resulting in sustained tightness pain and some disturbed sensation.  In terms of treatment, she is already being careful with her activities such as weightlifting etc. we talked about therapies as well as medications.  I will start her on tizanidine 2 mg at bedtime.  If she tolerates it well and finds it helpful she can take 2 mg in the morning and 2 mg in the afternoon.  She will watch for somnolence.  Considering she is still quite tender in multiple areas, I would repeat trigger point injections today.     20 minutes for the evaluation and management portion of the visit, exclusive of the procedure time, greater than 50% was for counseling on above-mentioned issues.     Procedure note: With informed consent, after explaining the benefits and risks of the procedure, using ChloraPrep for skin prep, 1 cc of Celestone with 5 cc of 0.5% Marcaine, using 25-gauge needle, triggers  in the cervical paraspinals, trapezius and levator scapula in the neck and shoulder region, infraspinatus  on either side were injected with relief.  Postinjection, she found excellent relief in both the tightness and discomfort.  She can ice the region as needed, modified activities to avoid flareups and update as appropriate.    Thank you much for allowing me to assist in her care.     Mathieu Crowder MD      Again, thank you for allowing me to participate in the care of your patient.      Sincerely,    Mathieu Crowder MD

## 2024-09-17 NOTE — PROGRESS NOTES
Patient is here for trigger point injections for muscle tightness and pain.    She was last seen by me on 8/9/2024.  She felt it really helped with her mid back pain but the pain in the neck and shoulder continues.  She is here for reevaluation.     Patient is a very pleasant 38-year-old woman who reports having tightness with pain in the neck and shoulder region for the past 2 and half years.  She also has some back and gluteal pain.  She attributes this to lifting weights and has reduced that significantly.  She was evaluated by neurology because she was also describing some sensory deficit in the left thigh and brisk reflexes.  There is a family history of ALS in her mother.  She underwent imaging of the brain under spinal segments which were noted to be essentially unremarkable.  There is mild degenerative changes noted at L4-5 and C5 7.  She finds the pain is a 1 at best 5 currently and 9 at its worst.  It can be a 9 2-3 times a week.  She also has issues in her mid upper back region.  She denies any trauma and there is no history of motor vehicle accident.  She continues to remain active.  She also is a runner and ended up having a labral tear requiring hip surgery.  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  She is careful with any lifting at this point.  She also reports some flareup in her lower spine also resulting in some weird sensation.  She is hoping for some relief.     She takes hydrochlorothiazide for hypertension.  She has taken ibuprofen as needed     EXAM: MR CERVICAL SPINE W/O and W CONTRAST, MR THORACIC SPINE W/O and W CONTRAST, MR LUMBAR SPINE W/O and W CONTRAST, MR BRAIN W/O and W CONTRAST  LOCATION: [facility]  DATE: 2/23/2024     INDICATION: migrating paresthesia  COMPARISON: None.  CONTRAST: 6.5 ml Gadavist given  TECHNIQUE:   1) Routine multiplanar multisequence head MRI without and with intravenous contrast.  2) Routine Cervical Spine MRI without and with IV  contrast.  3) Routine Thoracic Spine MRI without and with IV contrast.  4) Routine Lumbar Spine MRI without and with IV contrast.     FINDINGS:  HEAD MRI:  INTRACRANIAL CONTENTS: No acute or subacute infarct. No mass, acute hemorrhage, or extra-axial fluid collections. Normal brain parenchymal signal. Normal ventricles and sulci. Normal position of the cerebellar tonsils. No pathologic enhancement.     SELLA: No abnormality accounting for technique.     OSSEOUS STRUCTURES/SOFT TISSUES: Normal marrow signal. The major intracranial vascular flow voids are maintained.      ORBITS: No abnormality accounting for technique.      SINUSES/MASTOIDS: No paranasal sinus mucosal disease. No middle ear or mastoid effusion.      CERVICAL SPINE:   Grade 1 retrolisthesis of C5 on C6. Vertebral body heights are maintained. Modic type II endplate change at C5-C6 and C6-C7. Vertebral body heights are maintained. No abnormal cord signal. No extraspinal abnormality. Negative for pathologic contrast   enhancement.     Craniovertebral junction and C1-C2: Normal.     C2-C3: Normal disc height. No herniation. Normal facets. No spinal canal or neural foraminal stenosis.      C3-C4: Normal disc height. No herniation. Normal facets. No spinal canal or neural foraminal stenosis.      C4-C5: Normal disc height. No herniation. Normal facets. No spinal canal or neural foraminal stenosis.      C5-C6: Mild disc bulge. Mild facet hypertrophy. Mild spinal canal stenosis. Mild right foraminal stenosis. No left foraminal stenosis.      C6-C7: Disc osteophyte complex. Normal facets. No spinal canal or foraminal stenosis.      C7-T1: Normal disc height. No herniation. Normal facets. No spinal canal or neural foraminal stenosis.     THORACIC SPINE:  Normal vertebral body heights, alignment and marrow signal. Normal disc heights. No herniation. Normal facets. No spinal canal or neural foraminal stenosis. No abnormal cord signal. Negative for pathologic  contrast enhancement.     No extraspinal abnormality.     LUMBAR SPINE:   Nomenclature is based on 5 lumbar type vertebral bodies. Normal vertebral body heights, alignment and marrow signal. Normal distal spinal cord and cauda equina with conus medullaris at T12-L1. No extraspinal abnormality. Partial kidney is incidentally   noted. Negative for pathologic contrast enhancement.     T12-L1: Normal disc height and signal. No herniation. Normal facets. No spinal canal or neural foraminal stenosis.      L1-L2: Normal disc height and signal. No herniation. Normal facets. No spinal canal or neural foraminal stenosis.     L2-L3: Normal disc height and signal. No herniation. Normal facets. No spinal canal or neural foraminal stenosis.      L3-L4: Normal disc height and signal. No herniation. Normal facets. No spinal canal or neural foraminal stenosis.     L4-L5: Normal disc height and signal. Mild disc bulge. Bilateral facet hypertrophy with small facet effusions. No spinal canal or neural foraminal stenosis.     L5-S1: Normal disc height and signal. No herniation. Normal facets. No spinal canal or neural foraminal stenosis.                                                                      IMPRESSION:  HEAD MRI:  Normal head MRI.     CERVICAL SPINE MRI:  1.  Degenerative disc disease at C5-C6 and C6-C7. No high-grade spinal canal or foraminal stenosis.  2.  No cord signal abnormality.  3.  Modic type II endplate change at C5-C6 and C6-C7     THORACIC SPINE MRI:  Normal MRI of the thoracic spine.     LUMBAR SPINE MRI:  1.  Mild degenerative disc disease at L4-L5.  2.  No spinal canal or foraminal stenosis.  3.  No pathologic contrast enhancement.        Current Outpatient Prescriptions          Current Outpatient Medications   Medication Sig Dispense Refill    hydrochlorothiazide (MICROZIDE) 12.5 MG capsule Take 1 capsule by mouth daily        ibuprofen (ADVIL/MOTRIN) 200 MG tablet Take 200 mg by mouth as needed for pain         fluconazole (DIFLUCAN) 150 MG tablet Take 1 tablet (150 mg) by mouth every 3 days for 2 to 3 doses 3 tablet 0    MICROGESTIN FE 1/20, 28, 1 mg-20 mcg (21)/75 mg (7) per tablet [MICROGESTIN FE 1/20, 28, 1 MG-20 MCG (21)/75 MG (7) PER TABLET]    0         Social History   Social History            Socioeconomic History    Marital status:        Spouse name: Not on file    Number of children: Not on file    Years of education: Not on file    Highest education level: Not on file   Occupational History    Not on file   Tobacco Use    Smoking status: Never    Smokeless tobacco: Never   Substance and Sexual Activity    Alcohol use: Not on file    Drug use: Not on file    Sexual activity: Not on file   Other Topics Concern    Not on file   Social History Narrative     ** Merged History Encounter **            Social Determinants of Health           Financial Resource Strain: Not At Risk (6/5/2023)     Received from [facility]     Financial Resource Strain      Is it hard for you to pay for the very basics like food, housing, medical care or heating?: No   Food Insecurity: Not At Risk (6/5/2023)     [facility]     Food Insecurity      Does your food run out before you have the money to buy more?: No   Transportation Needs: Not At Risk (6/5/2023)     Received from [facility]     Transportation Needs      Does a lack of transportation keep you from your medical appointments or from getting your medications?: No   Physical Activity: Not on file   Stress: Not on file   Social Connections: Not on file   Interpersonal Safety: Not on file   Housing Stability: Not on file            There were no vitals taken for this visit.      On examination, patient is alert and cooperative.  Vital signs are stable.  She is afebrile.     HEENT is negative.  Extraocular movements are intact.  Face is symmetric.  Tongue is midline.  Neck is supple.     She is  able to move her upper extremities functionally.  She is able to carry out straight leg raising test.  Jordon's negative.     She is able to stand.  Romberg is negative.  Gait is unremarkable.  She is able to walk on her heels and toes.  Coordination tests are intact.     Musculoskeletal examination reveals tenderness over the levator scapula and paraspinal region of the neck bilaterally.  Trapezius, and infraspinatus on either side were tender. There is no tenderness over the greater or lesser occipital nerves or the cervical facets.     Impression: At this point this 38-year-old woman with chronic tightness and pain affecting head neck and shoulder region has features of myalgia with involvement of multiple muscles resulting in sustained tightness pain and some disturbed sensation.  In terms of treatment, she is already being careful with her activities such as weightlifting etc. we talked about therapies as well as medications.  I will start her on tizanidine 2 mg at bedtime.  If she tolerates it well and finds it helpful she can take 2 mg in the morning and 2 mg in the afternoon.  She will watch for somnolence.  Considering she is still quite tender in multiple areas, I would repeat trigger point injections today.     20 minutes for the evaluation and management portion of the visit, exclusive of the procedure time, greater than 50% was for counseling on above-mentioned issues.     Procedure note: With informed consent, after explaining the benefits and risks of the procedure, using ChloraPrep for skin prep, 1 cc of Celestone with 5 cc of 0.5% Marcaine, using 25-gauge needle, triggers in the cervical paraspinals, trapezius and levator scapula in the neck and shoulder region, infraspinatus  on either side were injected with relief.  Postinjection, she found excellent relief in both the tightness and discomfort.  She can ice the region as needed, modified activities to avoid flareups and update as  appropriate.    Thank you much for allowing me to assist in her care.     Mathieu Crowder MD

## 2025-03-16 ENCOUNTER — HEALTH MAINTENANCE LETTER (OUTPATIENT)
Age: 40
End: 2025-03-16